# Patient Record
Sex: FEMALE | Race: ASIAN | Employment: PART TIME | URBAN - METROPOLITAN AREA
[De-identification: names, ages, dates, MRNs, and addresses within clinical notes are randomized per-mention and may not be internally consistent; named-entity substitution may affect disease eponyms.]

---

## 2018-06-07 ENCOUNTER — OFFICE VISIT (OUTPATIENT)
Dept: MIDWIFE SERVICES | Facility: CLINIC | Age: 23
End: 2018-06-07
Payer: COMMERCIAL

## 2018-06-07 VITALS
SYSTOLIC BLOOD PRESSURE: 121 MMHG | WEIGHT: 134 LBS | HEART RATE: 92 BPM | TEMPERATURE: 98.5 F | DIASTOLIC BLOOD PRESSURE: 73 MMHG

## 2018-06-07 DIAGNOSIS — Z01.419 ENCOUNTER FOR WELL WOMAN EXAM WITH ROUTINE GYNECOLOGICAL EXAM: ICD-10-CM

## 2018-06-07 DIAGNOSIS — R30.0 DYSURIA: Primary | ICD-10-CM

## 2018-06-07 DIAGNOSIS — N89.8 VAGINAL DISCHARGE: ICD-10-CM

## 2018-06-07 DIAGNOSIS — R82.90 NONSPECIFIC FINDING ON EXAMINATION OF URINE: ICD-10-CM

## 2018-06-07 LAB
ALBUMIN UR-MCNC: NEGATIVE MG/DL
APPEARANCE UR: CLEAR
BILIRUB UR QL STRIP: NEGATIVE
COLOR UR AUTO: YELLOW
GLUCOSE UR STRIP-MCNC: NEGATIVE MG/DL
HGB UR QL STRIP: ABNORMAL
KETONES UR STRIP-MCNC: NEGATIVE MG/DL
LEUKOCYTE ESTERASE UR QL STRIP: ABNORMAL
NITRATE UR QL: NEGATIVE
NON-SQ EPI CELLS #/AREA URNS LPF: ABNORMAL /LPF
PH UR STRIP: 5.5 PH (ref 5–7)
RBC #/AREA URNS AUTO: ABNORMAL /HPF
SOURCE: ABNORMAL
SP GR UR STRIP: <=1.005 (ref 1–1.03)
SPECIMEN SOURCE: ABNORMAL
UROBILINOGEN UR STRIP-ACNC: 0.2 EU/DL (ref 0.2–1)
WBC #/AREA URNS AUTO: ABNORMAL /HPF
WET PREP SPEC: ABNORMAL

## 2018-06-07 PROCEDURE — 81001 URINALYSIS AUTO W/SCOPE: CPT | Performed by: ADVANCED PRACTICE MIDWIFE

## 2018-06-07 PROCEDURE — 87086 URINE CULTURE/COLONY COUNT: CPT | Performed by: ADVANCED PRACTICE MIDWIFE

## 2018-06-07 PROCEDURE — G0145 SCR C/V CYTO,THINLAYER,RESCR: HCPCS | Performed by: ADVANCED PRACTICE MIDWIFE

## 2018-06-07 PROCEDURE — 87210 SMEAR WET MOUNT SALINE/INK: CPT | Performed by: ADVANCED PRACTICE MIDWIFE

## 2018-06-07 PROCEDURE — 99203 OFFICE O/P NEW LOW 30 MIN: CPT | Performed by: ADVANCED PRACTICE MIDWIFE

## 2018-06-07 RX ORDER — PHENAZOPYRIDINE HYDROCHLORIDE 200 MG/1
200 TABLET, FILM COATED ORAL 3 TIMES DAILY PRN
Qty: 6 TABLET | Refills: 0 | Status: SHIPPED | OUTPATIENT
Start: 2018-06-07

## 2018-06-07 RX ORDER — NITROFURANTOIN 25; 75 MG/1; MG/1
100 CAPSULE ORAL 2 TIMES DAILY
Qty: 14 CAPSULE | Refills: 0 | Status: SHIPPED | OUTPATIENT
Start: 2018-06-07

## 2018-06-07 RX ORDER — FLUCONAZOLE 150 MG/1
150 TABLET ORAL
Qty: 4 TABLET | Refills: 0 | Status: SHIPPED | OUTPATIENT
Start: 2018-06-07

## 2018-06-07 NOTE — PROGRESS NOTES
Alyce Rivero is a 22 year old Patient's last menstrual period was 05/12/2018 (exact date).  who presents today with c/o seeing blood in her urine this am and over the past 24 hrs has had increasing burning urgency and frequency with urination.      UTI HX: none.  ADDITIONAL SX: Increased white vaginal discharge and vaginal burning with urination    REVIEW OF SYSTEMS  C: NEGATIVE for fever, chills, change in weight  I: NEGATIVE for worrisome rashes, moles or lesions  E/M: NEGATIVE for ear, mouth and throat problems  R: NEGATIVE for significant cough or SOB  CV: NEGATIVE for chest pain, palpitations or peripheral edema  GI: NEGATIVE for nausea, abdominal pain, heartburn, or change in bowel habits   as above in HPI  PSYCHIATRIC: Denies anxiety, depression, or other mental health disorders    OBJECTIVE:   EXAM  /73  Pulse 92  Temp 98.5  F (36.9  C) (Oral)  Wt 134 lb (60.8 kg)  LMP 05/12/2018 (Exact Date)  Breastfeeding? No  Patient appears well, afebrile.   Physical exam shows no flank tenderness or s/s of pyelonephritis.   Urine dip shows blood, leuk estrace,     PELVIC EXAM:  Vulva: BUS WNL, no lesions noted,   Vagina: Discharge normal and physiologic, no lesions, well rugated, good tone,   Cervix: Smooth, pink, no visible lesions, neg CMT,   Uterus: Normal size and position, non-tender, mobile,   Ovaries: No masses palpable, non-tender, mobile,   Rectal exam: deferred    ASSESSMENT: uncomplicated UTI  Wet prep  Culture pending    PLAN:   (R30.0) Dysuria  (primary encounter diagnosis)  Comment:   Plan: *UA reflex to Microscopic and Culture ([facility] (except [place] and         [place])  Probable uncomplicated UTI, treated with macrobid and pyridium            (N89.8) Vaginal discharge  Comment:   Plan: Wet prep        Wet prep = yeast, treated with diflucan    (Z01.419) Encounter for well woman exam with routine gynecological exam  Comment:   Plan: Pap imaged thin layer  screen only - recommended        age 21 - 24 years            Increase hydration and rest.  Complete abx course and call with continued or worsening symptoms especially fever or back/flank pain.      Ana Reina CNM

## 2018-06-07 NOTE — PATIENT INSTRUCTIONS
It was a pleasure to meet with you today.  Here is a list of suggestions that may help treat vaginal infections and may help maintain a healthy vaginal environment.    Many of these suggestions are for;   1. boosting your immune system so you can heal faster  2. changing the vaginal environment to a more acid state   3.  increasing the good healthy bacteria    Read through them and try the ones that seem to fit you and your life style.    Soak in a warm bath tub, no soap, no bubble bath and no oils.  Add one cup vinegar or lemon juice to bath water once in a while,     Keep a water bottle with a squirt top in your bathroom, fill with warm water and use as a spray after wiping, then pat dry.  May add 1-3 TBS vinegar to help maintain an acidic environment.    Wrap an ice pack in a washcloth and tuck it next to the itching area.  15 minutes of a cold compress may provide up to 4 hours of relief.  Cold and itch travel the same nerve pathways and the cold blocks the itching feeling.    Wear cotton underwear; loose pants or skirt, no pantyhose.  No thong underwear.    Do not wear underwear to bed.  The vaginal environment needs to breathe.    Keep vaginal area dry, you can even use a hairdryer on cool setting after shower or bath    To boost your immune system increase daily intake of;  1. Rest  2. Fluids (2-3 quarts per day),   3. Foods such as nuts, grains, raw vegetables, yogurt, ronald, grapefruit, unsweetened cranberries and juices (8 oz daily)  4. Vitamin intake (if not pregnant)  Vitamin B complex 100mg  Calcium 1000mg  Magnesium 500mg  Vitamin C 2-4 grams  Vitamin E 1,000 IU  Vitamin A 50,000 IU    Decrease daily intake of refined sugars, honey, red meat and alcohol    At each meal drink 1tsp apple cider vinegar and 1 tsp honey in   cup warm water    Acidophilus 4-5 TBS in one quart of water 3-4 times daily or as tablets 2-3 tabs 3-4 times a day    Apply plain yogurt externally to vaginal area, chamomile or  chickweed cream - applied externally for relief of itching (may be found commercially).    Echinacea - 3 times a day for chronic problem or every 2 hours for acute symptoms    Take garlic daily, capsules or fresh.      Boric acid, insert 2 capsules  00  daily into vagina for seven days (found at most [employer] stores or co-ops)    If your symptoms do not resolve or if you have questions please call the clinic.        Compiled by Magy Shannon CNM, adapted by Ana Reina CNM

## 2018-06-07 NOTE — MR AVS SNAPSHOT
After Visit Summary   6/7/2018    Alyce Rivero    MRN: [id number]           Patient Information     Date Of Birth          1995        Visit Information        Provider Department      6/7/2018 11:00 AM Ana Reina APRN [facility]        Today's Diagnoses     Dysuria    -  1    Vaginal discharge          Care Instructions        It was a pleasure to meet with you today.  Here is a list of suggestions that may help treat vaginal infections and may help maintain a healthy vaginal environment.    Many of these suggestions are for;   1. boosting your immune system so you can heal faster  2. changing the vaginal environment to a more acid state   3.  increasing the good healthy bacteria    Read through them and try the ones that seem to fit you and your life style.    Soak in a warm bath tub, no soap, no bubble bath and no oils.  Add one cup vinegar or lemon juice to bath water once in a while,     Keep a water bottle with a squirt top in your bathroom, fill with warm water and use as a spray after wiping, then pat dry.  May add 1-3 TBS vinegar to help maintain an acidic environment.    Wrap an ice pack in a washcloth and tuck it next to the itching area.  15 minutes of a cold compress may provide up to 4 hours of relief.  Cold and itch travel the same nerve pathways and the cold blocks the itching feeling.    Wear cotton underwear; loose pants or skirt, no pantyhose.  No thong underwear.    Do not wear underwear to bed.  The vaginal environment needs to breathe.    Keep vaginal area dry, you can even use a hairdryer on cool setting after shower or bath    To boost your immune system increase daily intake of;  1. Rest  2. Fluids (2-3 quarts per day),   3. Foods such as nuts, grains, raw vegetables, yogurt, ronald, grapefruit, unsweetened cranberries and juices (8 oz daily)  4. Vitamin intake (if not pregnant)  Vitamin B complex 100mg  Calcium 1000mg  Magnesium 500mg  Vitamin  "C 2-4 grams  Vitamin E 1,000 IU  Vitamin A 50,000 IU    Decrease daily intake of refined sugars, honey, red meat and alcohol    At each meal drink 1tsp apple cider vinegar and 1 tsp honey in   cup warm water    Acidophilus 4-5 TBS in one quart of water 3-4 times daily or as tablets 2-3 tabs 3-4 times a day    Apply plain yogurt externally to vaginal area, chamomile or chickweed cream - applied externally for relief of itching (may be found commercially).    Echinacea - 3 times a day for chronic problem or every 2 hours for acute symptoms    Take garlic daily, capsules or fresh.      Boric acid, insert 2 capsules  00  daily into vagina for seven days (found at most health food stores or co-ops)    If your symptoms do not resolve or if you have questions please call the clinic.        Compiled by Magy Shannon CNM, adapted by Ana Reina CNM          Follow-ups after your visit        Who to contact     If you have questions or need follow up information about today's clinic visit or your schedule please contact [facility] directly at [phone].  Normal or non-critical lab and imaging results will be communicated to you by MyChart, letter or phone within 4 business days after the clinic has received the results. If you do not hear from us within 7 days, please contact the clinic through [employer] or phone. If you have a critical or abnormal lab result, we will notify you by phone as soon as possible.  Submit refill requests through [employer] or call your pharmacy and they will forward the refill request to us. Please allow 3 business days for your refill to be completed.          Additional Information About Your Visit        [employer] Information     [employer] lets you send messages to your doctor, view your test results, renew your prescriptions, schedule appointments and more. To sign up, go to [email or web address] . Click on \"Log in\" on the left side of the screen, which will take you to the " "Welcome page. Then click on \"Sign up Now\" on the right side of the page.     You will be asked to enter the access code listed below, as well as some personal information. Please follow the directions to create your username and password.     Your access code is: [email or web address]  Expires: 2018 11:05 AM     Your access code will  in 90 days. If you need help or a new code, please call your [place] clinic or [phone].        Care EveryWhere ID     This is your Care EveryWhere ID. This could be used by other organizations to access your [place] medical records  [id number]        Your Vitals Were     Pulse Temperature Last Period Breastfeeding?          92 98.5  F (36.9  C) (Oral) 2018 (Exact Date) No         Blood Pressure from Last 3 Encounters:   18 121/73    Weight from Last 3 Encounters:   18 134 lb (60.8 kg)              We Performed the Following     *UA reflex to Microscopic and Culture ([place] [facility] (except [place] and [place])     Wet prep        Primary Care Provider Office Phone # Fax #    [facility] [phone]       [address] [address]        Equal Access to Services     EMERSON KLINE AH: Hadii aad ku hadasho Soomaali, waaxda luqadaha, qaybta kaalmada adeegyada, waxay idiin haykavitan roel arora laotto dunn. So [facility] [phone].    ATENCIÓN: Si habla español, tiene a rowley disposición servicios gratuitos de asistencia lingüística. Llame al [phone].    We comply with applicable federal civil rights laws and [place] laws. We do not discriminate on the basis of race, color, national origin, age, disability, sex, sexual orientation, or gender identity.            Thank you!     Thank you for choosing [facility]  for your care. Our goal is always to provide you with excellent care. Hearing back from our patients is one way we can continue to improve our services. Please take a few minutes to " complete the written survey that you may receive in the mail after your visit with us. Thank you!             Your Updated Medication List - Protect others around you: Learn how to safely use, store and throw away your medicines at www.disposemymeds.org.          This list is accurate as of 6/7/18 11:05 AM.  Always use your most recent med list.                   Brand Name Dispense Instructions for use Diagnosis    ALLEGRA ALLERGY PO      Take 10 mg by mouth

## 2018-06-07 NOTE — NURSING NOTE
Chief Complaint   Patient presents with     UTI       Initial /73  Pulse 92  Temp 98.5  F (36.9  C) (Oral)  Wt 134 lb (60.8 kg)  LMP 05/12/2018 (Exact Date)  Breastfeeding? No There is no height or weight on file to calculate BMI.  BP completed using cuff size: regular    No obstetric history on file.    The following HM Due: NONE      The following patient reported/Care Every where data was sent to:  P ABSTRACT QUALITY INITIATIVES [40014]  n      n/a

## 2018-06-08 LAB
BACTERIA SPEC CULT: NO GROWTH
SPECIMEN SOURCE: NORMAL

## 2018-06-11 LAB
COPATH REPORT: NORMAL
PAP: NORMAL

## 2024-10-18 NOTE — LETTER
June 13, 2018      Alyce Rivero  [address]  [place] [address]    Dear ,      I am happy to inform you that your recent cervical cancer screening test (PAP smear) was normal.      Preventative screenings such as this help to ensure your health for years to come. You should repeat a pap smear in 3 years, unless otherwise directed.      You will still need to return to the clinic every year for your annual exam and other preventive tests.     Please contact the clinic at [phone] if you have further questions.       Sincerely,      BEATRIS Wolf CNM/[facility]      
pac[facility] home